# Patient Record
Sex: MALE | Race: WHITE | URBAN - METROPOLITAN AREA
[De-identification: names, ages, dates, MRNs, and addresses within clinical notes are randomized per-mention and may not be internally consistent; named-entity substitution may affect disease eponyms.]

---

## 2023-01-31 RX ORDER — IBUPROFEN 600 MG/1
600 TABLET ORAL EVERY 6 HOURS PRN
COMMUNITY
Start: 2019-05-03

## 2025-04-03 ENCOUNTER — TELEPHONE (OUTPATIENT)
Age: 45
End: 2025-04-03

## 2025-04-03 NOTE — TELEPHONE ENCOUNTER
GASTROENTEROLOGY none Callum Alas MD 2 2     Diagnosis Information    Diagnosis   R07.0 (ICD-10-CM) - Throat pain     Service-Level Authorizations    No service level authorizations were found.  Triage Information    Decision: (none)   Priority: Routine   Schedule by date: 5/3/2025     Scheduling Instructions           Service-Level Authorizations    No service level authorizations were found.

## 2025-04-25 ENCOUNTER — PREP FOR PROCEDURE (OUTPATIENT)
Age: 45
End: 2025-04-25

## 2025-04-25 ENCOUNTER — SCHEDULED TELEPHONE ENCOUNTER (OUTPATIENT)
Age: 45
End: 2025-04-25

## 2025-04-25 DIAGNOSIS — R07.0 THROAT PAIN: Primary | ICD-10-CM

## 2025-04-25 NOTE — PROGRESS NOTES
Patient scheduled for an EGD 4/29/2025  Instructions emailed  Surgical Registration Form scanned.  Case Request Opened

## 2025-04-28 ENCOUNTER — ANESTHESIA EVENT (OUTPATIENT)
Age: 45
End: 2025-04-28
Payer: COMMERCIAL

## 2025-04-28 RX ORDER — SODIUM CHLORIDE, SODIUM LACTATE, POTASSIUM CHLORIDE, CALCIUM CHLORIDE 600; 310; 30; 20 MG/100ML; MG/100ML; MG/100ML; MG/100ML
INJECTION, SOLUTION INTRAVENOUS CONTINUOUS
Status: CANCELLED | OUTPATIENT
Start: 2025-04-28

## 2025-04-28 RX ORDER — SODIUM CHLORIDE 9 MG/ML
INJECTION, SOLUTION INTRAVENOUS PRN
Status: CANCELLED | OUTPATIENT
Start: 2025-04-28

## 2025-04-29 ENCOUNTER — HOSPITAL ENCOUNTER (OUTPATIENT)
Age: 45
Setting detail: OUTPATIENT SURGERY
Discharge: HOME | End: 2025-04-29
Attending: INTERNAL MEDICINE | Admitting: INTERNAL MEDICINE
Payer: COMMERCIAL

## 2025-04-29 ENCOUNTER — ANESTHESIA (OUTPATIENT)
Age: 45
End: 2025-04-29
Payer: COMMERCIAL

## 2025-04-29 VITALS
TEMPERATURE: 97.6 F | OXYGEN SATURATION: 93 % | RESPIRATION RATE: 18 BRPM | HEIGHT: 62 IN | WEIGHT: 193.1 LBS | BODY MASS INDEX: 35.53 KG/M2 | SYSTOLIC BLOOD PRESSURE: 111 MMHG | DIASTOLIC BLOOD PRESSURE: 74 MMHG | HEART RATE: 83 BPM

## 2025-04-29 DIAGNOSIS — R13.19 OTHER DYSPHAGIA: Primary | ICD-10-CM

## 2025-04-29 DIAGNOSIS — R07.0 THROAT PAIN: ICD-10-CM

## 2025-04-29 PROCEDURE — 2709999900 HC NON-CHARGEABLE SUPPLY: Performed by: INTERNAL MEDICINE

## 2025-04-29 PROCEDURE — 43235 EGD DIAGNOSTIC BRUSH WASH: CPT | Performed by: INTERNAL MEDICINE

## 2025-04-29 PROCEDURE — 7100000010 HC PHASE II RECOVERY - FIRST 15 MIN: Performed by: INTERNAL MEDICINE

## 2025-04-29 PROCEDURE — 7100000011 HC PHASE II RECOVERY - ADDTL 15 MIN: Performed by: INTERNAL MEDICINE

## 2025-04-29 PROCEDURE — 3600007501: Performed by: INTERNAL MEDICINE

## 2025-04-29 PROCEDURE — 3700000000 HC ANESTHESIA ATTENDED CARE: Performed by: INTERNAL MEDICINE

## 2025-04-29 PROCEDURE — 6360000002 HC RX W HCPCS

## 2025-04-29 PROCEDURE — 2580000003 HC RX 258: Performed by: INTERNAL MEDICINE

## 2025-04-29 RX ORDER — SODIUM CHLORIDE 0.9 % (FLUSH) 0.9 %
5-40 SYRINGE (ML) INJECTION EVERY 12 HOURS SCHEDULED
Status: DISCONTINUED | OUTPATIENT
Start: 2025-04-29 | End: 2025-04-29 | Stop reason: HOSPADM

## 2025-04-29 RX ORDER — LIDOCAINE HYDROCHLORIDE 20 MG/ML
INJECTION, SOLUTION INFILTRATION; PERINEURAL
Status: DISCONTINUED | OUTPATIENT
Start: 2025-04-29 | End: 2025-04-29 | Stop reason: SDUPTHER

## 2025-04-29 RX ORDER — SODIUM CHLORIDE 0.9 % (FLUSH) 0.9 %
5-40 SYRINGE (ML) INJECTION PRN
Status: DISCONTINUED | OUTPATIENT
Start: 2025-04-29 | End: 2025-04-29 | Stop reason: HOSPADM

## 2025-04-29 RX ORDER — PROPOFOL 10 MG/ML
INJECTION, EMULSION INTRAVENOUS
Status: DISCONTINUED | OUTPATIENT
Start: 2025-04-29 | End: 2025-04-29 | Stop reason: SDUPTHER

## 2025-04-29 RX ORDER — SODIUM CHLORIDE, SODIUM LACTATE, POTASSIUM CHLORIDE, CALCIUM CHLORIDE 600; 310; 30; 20 MG/100ML; MG/100ML; MG/100ML; MG/100ML
INJECTION, SOLUTION INTRAVENOUS CONTINUOUS
Status: DISCONTINUED | OUTPATIENT
Start: 2025-04-29 | End: 2025-04-29 | Stop reason: HOSPADM

## 2025-04-29 RX ADMIN — SODIUM CHLORIDE, POTASSIUM CHLORIDE, SODIUM LACTATE AND CALCIUM CHLORIDE: 600; 310; 30; 20 INJECTION, SOLUTION INTRAVENOUS at 16:19

## 2025-04-29 RX ADMIN — PROPOFOL 150 MG: 10 INJECTION, EMULSION INTRAVENOUS at 16:28

## 2025-04-29 RX ADMIN — PROPOFOL 150 MG: 10 INJECTION, EMULSION INTRAVENOUS at 16:26

## 2025-04-29 RX ADMIN — LIDOCAINE HYDROCHLORIDE 100 MG: 20 INJECTION, SOLUTION INFILTRATION; PERINEURAL at 16:25

## 2025-04-29 ASSESSMENT — PAIN - FUNCTIONAL ASSESSMENT
PAIN_FUNCTIONAL_ASSESSMENT: ADULT NONVERBAL PAIN SCALE (NPVS)
PAIN_FUNCTIONAL_ASSESSMENT: NONE - DENIES PAIN

## 2025-04-29 NOTE — ANESTHESIA POSTPROCEDURE EVALUATION
Department of Anesthesiology  Postprocedure Note    Patient: Wyatt Mendoza  MRN: [id number]  YOB: 1980  Date of evaluation: 4/29/2025    Procedure Summary       Date: 04/29/25 Room / Location: [facility] ENDO 01 / [facility] ENDOSCOPY    Anesthesia Start: 1623 Anesthesia Stop: 1644    Procedure: EGD (Upper GI Region) Diagnosis:       Throat pain      (Throat pain [R07.0])    Surgeons: Callum Alas MD Responsible Provider: Mason Jiang APRN - CRNA    Anesthesia Type: MAC ASA Status: 1            Anesthesia Type: MAC    Yudith Phase I:      Yudith Phase II: Yudith Score: 9    Anesthesia Post Evaluation    Patient location during evaluation: PACU  Patient participation: complete - patient participated  Level of consciousness: sleepy but conscious  Pain score: 0  Airway patency: patent  Nausea & Vomiting: no nausea and no vomiting  Cardiovascular status: hemodynamically stable  Respiratory status: acceptable, spontaneous ventilation and room air  Hydration status: stable  Comments: Ok to discharge once criteria met  Multimodal analgesia pain management approach  Pain management: adequate and satisfactory to patient    No notable events documented.

## 2025-04-29 NOTE — OP NOTE
EGD procedure note    Date of procedure: 4/29/2025    Indication for procedure: Throat pain and possible thickening of thoracic esophagus on CT scan.    Type of procedure: Upper endoscopy     Anesthesia classification: ASA class 2    Patient history and physical has been accomplished and documented.    Patient is assessed and determined to be an appropriate candidate for planned procedure and sedation.  The patient was assessed immediately prior to sedation.    Sedation plan: MAC per anesthesia.    Surgical assistant: Not applicable    Airway assessment: Range of motion: normal, mouth opening: Normal, visual obstruction: No.    PREOP EXAM:  Unchanged.    VS: Reviewed  Gen: WDWN in NAD  CV: RRR, no murmur  Resp: CTAB  Abd: Soft, NTND, +BS  Extrem: No cyanosis or edema  Neuro: Awake and alert      Informed consent obtained: Yes.  The indications, risks, including but not limited to bleeding, perforation, infection, death, potential for failure to visualize or diagnose neoplasia, alternatives, benefits, discussed in detail with the patient prior to the procedure.  Patient understands and agrees to the procedure.  Patient identity and procedure were verified, consent was obtained, and is consistent with the consent form in the patient's records.    INSTRUMENT: Olympus upper endoscope    PROCEDURE FINDINGS:    OROPHARYNX: Normal.  The oropharynx had no evidence of erythema or edema.    ESOPHAGUS:  Esophageal mucosa normal with no masses noted in the proximal, mid, and distal esophagus.   No endoscopic features of eosinophilic esophagitis were noted.  The Z-line was at 40 cm. and was normal.    No hiatal hernia was noted distally.    Specifically there was no thickening or abnormalities noted in the thoracic esophagus as suggested by the CT scan.    STOMACH: Stomach was then evaluated including the cardia and fundus on retroflexion view.  Evaluation of the gastric body, antrum, and pylorus were normal, including normal

## 2025-04-29 NOTE — INTERVAL H&P NOTE
Update History & Physical    The patient's History and Physical of April 29, 2025 was reviewed with the patient and I examined the patient. There was no change. The surgical site was confirmed by the patient and me.     Plan: The risks, benefits, expected outcome, and alternative to the recommended procedure have been discussed with the patient. Patient understands and wants to proceed with the procedure.     Electronically signed by Callum Alas MD on 4/29/2025 at 2:41 PM

## 2025-04-29 NOTE — H&P
Open access updated H&P.      Brief history: The patient is male White (non-) 44 y.o. who was referred for throat pain to be evaluated by EGD.  Review of the records showed that they had few if any health problems, excessive obesity, or significant medications that would require office evaluation prior throat pain to EGD for throat pain.  After review of their chart, contact was made with the patient in discussion and literature sent regarding the procedure and the bowel preparation.  They are here now for their procedure.        Past medical and surgical history:   Past Medical History:   Diagnosis Date    Kidney stone     History reviewed. No pertinent surgical history.     Allergies:  No Known Allergies     Medications:  No current facility-administered medications for this encounter.    Current Outpatient Medications:     ibuprofen (ADVIL;MOTRIN) 600 MG tablet, Take 600 mg by mouth every 6 hours as needed, Disp: , Rfl:      Family history:  The patient has a family history of no known GI disease.    Social history :    Social Connections: Not on file        ROS:   Review of Systems - negative     Vital signs:  Ht 1.575 m (5' 2\")   Wt 83.9 kg (185 lb)   BMI 33.84 kg/m²     PE: Healthy appearing male  HEENT: Normocephalic atraumatic.  No oral abnormalities.  Lungs: Clear to auscultation percussion.  Heart: Regular rate and rhythm without murmur rub or gallop.  Abdomen: Normal bowel sounds, soft nontender without hepatosplenomegaly or masses.  Extremities: No peripheral edema.  Neuro: No distinct abnormalities.    Impression:  1.  Throat pain.  The patient is a healthy male that is stable and here for evaluation of throat pain via EGD.      Recommendations:  1.  EGD with MAC.  The risks, benefits, adverse reactions including death and the possibility of missed lesions were neoplasia were discussed in detail today with the patient prior to the procedure.  They understand and agreed to undergo the

## 2025-04-29 NOTE — DISCHARGE INSTRUCTIONS
Recommendations:      1.  Continue present  therapy.      2.  Modified barium swallow to evaluate for oropharyngeal dysphagia.    3.  Further recommendations pending clinical course as needed.    4.  Follow up as needed.    5.  If the modified barium swallow shows no significant pathology, then his GI evaluation is complete and his symptoms are likely due to sinus drainage or other non-GI etiology.        Callum Alas MD

## 2025-04-29 NOTE — ANESTHESIA PRE PROCEDURE
Department of Anesthesiology  Preprocedure Note       Name:  Wyatt Mendoza   Age:  44 y.o.  :  1980                                          MRN:  [id number]         Date:  2025      Surgeon: Surgeon(s):  Callum Alas MD    Procedure: Procedure(s):  EGD    Medications prior to admission:   Prior to Admission medications    Medication Sig Start Date End Date Taking? Authorizing Provider   ibuprofen (ADVIL;MOTRIN) 600 MG tablet Take 600 mg by mouth every 6 hours as needed 5/3/19   Automatic Reconciliation, Ar       Current medications:    Current Facility-Administered Medications   Medication Dose Route Frequency Provider Last Rate Last Admin   • sodium chloride flush 0.9 % injection 5-40 mL  5-40 mL IntraVENous 2 times per day Madisyn Restrepo APRN - CRNA       • sodium chloride flush 0.9 % injection 5-40 mL  5-40 mL IntraVENous PRN Madisyn Restrepo APRN - CRNA       • lactated ringers infusion   IntraVENous Continuous Callum Alas MD           Allergies:  No Known Allergies    Problem List:    Patient Active Problem List   Diagnosis Code   • Throat pain R07.0       Past Medical History:        Diagnosis Date   • Kidney stone        Past Surgical History:  History reviewed. No pertinent surgical history.    Social History:    Social History     Tobacco Use   • Smoking status: Every Day     Current packs/day: 1.00     Types: Cigarettes   • Smokeless tobacco: Never   Substance Use Topics   • Alcohol use: Never                                Ready to quit: Not Answered  Counseling given: Not Answered      Vital Signs (Current):   Vitals:    25 1043 25 1444   BP:  (!) 147/94   Pulse:  90   Resp:  17   Temp:  36.4 °C (97.6 °F)   TempSrc:  Temporal   SpO2:  95%   Weight: 83.9 kg (185 lb) 87.6 kg (193 lb 1.6 oz)   Height: 1.575 m (5' 2\") 1.575 m (5' 2\")                                              BP Readings from Last 3 Encounters:   25 (!) 147/94   19 110/80

## (undated) DEVICE — TUBING IRRIGATION BK FLO VLV FOR OFP ENDOSTAT ENDOGATOR DISP

## (undated) DEVICE — SOLUTION IRRIG 500ML STRL H2O NONPYROGENIC

## (undated) DEVICE — SOLUTION IRRIG 1000ML STRL H2O USP PLAS POUR BTL

## (undated) DEVICE — TUBING INSUFFLATION CAP W/ EXT CARBON DIOX ENDO SMARTCAP

## (undated) DEVICE — TUBING, SUCTION, 9/32" X 10', STRAIGHT: Brand: MEDLINE

## (undated) DEVICE — CONMED SCOPE SAVER BITE BLOCK, 20X27 MM: Brand: SCOPE SAVER

## (undated) DEVICE — KIT COLON W/ 1.1OZ LUB AND 2 END

## (undated) DEVICE — Device: Brand: DEFENDO VALVE AND CONNECTOR KIT